# Patient Record
(demographics unavailable — no encounter records)

---

## 2025-05-12 NOTE — REASON FOR VISIT
[____ Week(s)] : [unfilled] week(s)  [Other: ____] : [unfilled] [Patient] : patient [Parents] : parents [Medical Records] : medical records [Normal bowel movements] : ~He/She~ has normal bowel movements [Tolerating Diet] : ~He/She~ is tolerating diet [Normal range of motion] : ~He/She~ has normal range of motion [Pain] : ~He/She~ does not have pain [Fever] : ~He/She~ does not have fever [Vomiting] : ~He/She~ does not have vomiting [Redness at incision] : ~He/She~ does not have redness at incision [Drainage at incision] : ~He/She~ does not have drainage at incision [Swelling at surgical site] : ~He/She~ does not have swelling at surgical site [Yellowing of skin/eyes] : ~He/She~ does not have yellowing of skin/eyes [de-identified] : 04/20/2025 [de-identified] : Dr. Mansfield

## 2025-05-12 NOTE — PHYSICAL EXAM
[Clean] : clean [Dry] : dry [Intact] : intact [NL] : grossly intact [Erythema] : no erythema [Granulation tissue] : no granulation tissue [Drainage] : no drainage [Rash] : no rash [Jaundice] : no jaundice

## 2025-05-12 NOTE — ASSESSMENT
[FreeTextEntry1] : Gil Jose is a 51 day old male who presents for post op eval now 3 weeks s/p umbilical exploration with closure of periumbilical fascia and skin on 4/20/25 secondary to wound evisceration after open pyloromyotomy on 4/13/25. Per parents he has been doing well since surgery, stating he has had some spit up after feeds but no vomiting, fever, constipation, diarrhea, or pain. On exam he is well appearing, incision site is well healed, abdomen is soft and nondistended. Dr Mansfield was in to see patient and is pleased with his recovery. We counseled the parents on signs and symptoms to return for, and they stated understanding. He will follow up with his pediatrician as regularly scheduled, and parents know how to get in touch with us if needed.